# Patient Record
Sex: FEMALE | Race: BLACK OR AFRICAN AMERICAN
[De-identification: names, ages, dates, MRNs, and addresses within clinical notes are randomized per-mention and may not be internally consistent; named-entity substitution may affect disease eponyms.]

---

## 2018-07-15 ENCOUNTER — HOSPITAL ENCOUNTER (EMERGENCY)
Dept: HOSPITAL 47 - EC | Age: 23
Discharge: HOME | End: 2018-07-15
Payer: SELF-PAY

## 2018-07-15 VITALS
TEMPERATURE: 97 F | RESPIRATION RATE: 18 BRPM | SYSTOLIC BLOOD PRESSURE: 121 MMHG | HEART RATE: 81 BPM | DIASTOLIC BLOOD PRESSURE: 67 MMHG

## 2018-07-15 DIAGNOSIS — Z53.8: ICD-10-CM

## 2018-07-15 DIAGNOSIS — F17.200: ICD-10-CM

## 2018-07-15 DIAGNOSIS — L72.9: Primary | ICD-10-CM

## 2018-07-15 LAB
HYALINE CASTS UR QL AUTO: 3 /LPF (ref 0–2)
PH UR: 6 [PH] (ref 5–8)
RBC UR QL: 1 /HPF (ref 0–5)
SP GR UR: 1.02 (ref 1–1.03)
SQUAMOUS UR QL AUTO: 3 /HPF (ref 0–4)
UROBILINOGEN UR QL STRIP: 3 MG/DL (ref ?–2)
WBC #/AREA URNS HPF: 11 /HPF (ref 0–5)

## 2018-07-15 PROCEDURE — 87491 CHLMYD TRACH DNA AMP PROBE: CPT

## 2018-07-15 PROCEDURE — 81025 URINE PREGNANCY TEST: CPT

## 2018-07-15 PROCEDURE — 87070 CULTURE OTHR SPECIMN AEROBIC: CPT

## 2018-07-15 PROCEDURE — 81001 URINALYSIS AUTO W/SCOPE: CPT

## 2018-07-15 PROCEDURE — 87808 TRICHOMONAS ASSAY W/OPTIC: CPT

## 2018-07-15 PROCEDURE — 87205 SMEAR GRAM STAIN: CPT

## 2018-07-15 PROCEDURE — 99283 EMERGENCY DEPT VISIT LOW MDM: CPT

## 2018-07-15 PROCEDURE — 87591 N.GONORRHOEAE DNA AMP PROB: CPT

## 2018-07-15 PROCEDURE — 87086 URINE CULTURE/COLONY COUNT: CPT

## 2018-07-15 NOTE — ED
General Adult HPI





- General


Chief complaint: Skin/Abscess/Foreign Body


Stated complaint: Lump Left Leg


Time Seen by Provider: 07/15/18 13:50


Source: patient, RN notes reviewed


Mode of arrival: ambulatory


Limitations: no limitations





- History of Present Illness


Initial comments: 





Patient 22-year-old female presented to the emergency room today with multiple 

complains.  Patient does admit that she's had a cyst to the back of the left 

leg over the last 6 years.  States that it started as a boil.  States that her 

family helped popliteal area but is stable like this small cyst like structure 

since.  States is not changed.  There is no tenderness.  No redness or other 

complaints.  Patient does admit that she is also worried about STDs.  She 

states that it's been over a year since she's had last unprotected sex.  She 

denies any specific symptoms but states she would like to be checked treated. 

Patient denies any recent fever, chills, shortness of breath, chest pain, back 

pain, abdominal pain, nausea or vomiting, numbness or tingling,  headaches or 

visual changes, or any other complaints.





Review of Systems


ROS Statement: 


Those systems with pertinent positive or pertinent negative responses have been 

documented in the HPI.





ROS Other: All systems not noted in ROS Statement are negative.





Past Medical History


Past Medical History: No Reported History


History of Any Multi-Drug Resistant Organisms: None Reported


Past Surgical History: No Surgical Hx Reported


Past Psychological History: No Psychological Hx Reported


Smoking Status: Current every day smoker


Past Alcohol Use History: None Reported


Past Drug Use History: None Reported





General Exam





- General Exam Comments


Initial Comments: 





General:  The patient is awake and alert, in no distress, and does not appear 

acutely ill. 


Eye:  Pupils are equal, round and reactive to light, extra-ocular movements are 

intact.  No nystagmus.  There is normal conjunctiva bilaterally.  No signs of 

icterus.  


Ears, nose, mouth and throat:  There are moist mucous membranes and no oral 

lesions. 


Neck:  The neck is supple, there is no tenderness or JVD.  


Cardiovascular:  There is a regular rate and rhythm. No murmur, rub or gallop 

is appreciated.


Respiratory:  Lungs are clear to auscultation, respirations are non-labored, 

breath sounds are equal.  No wheezes, stridor, rales, or rhonchi.


Musculoskeletal:  Normal ROM, no tenderness.  Strength 5/5. Sensation intact. 

Pulses equal bilaterally 2+.  


Neurological:  A&O x 3. CN II-XII intact, There are no obvious motor or sensory 

deficits. Coordination appears grossly intact. Speech is normal.


Skin:  Skin is warm and dry.  Cyst like mass to the back of the left thigh 

measuring approximately a centimeter across.  No redness erythema or sign of 

infection.


Psychiatric:  Cooperative, appropriate mood & affect, normal judgment.  


: ER RN to call present for exam.  Normal external exam.  No bleeding and no 

discharge on speculum exam.


Limitations: no limitations





Course





 Vital Signs











  07/15/18





  13:52


 


Temperature 97.0 F L


 


Pulse Rate 81


 


Respiratory 18





Rate 


 


Blood Pressure 121/67


 


O2 Sat by Pulse 98





Oximetry 














Medical Decision Making





- Medical Decision Making





Patient seen in the emergency room for a possible cyst back left thigh was also 

concerned about STDs.  She admits that it's been over a year since she had 

unprotected sex.  Patient initially wanted to be treated.  She states that this 

times no longer wants to be treated and be checked.  Cultures are currently 

pending.





- Lab Data





 Lab Results











  07/15/18 07/15/18 Range/Units





  14:07 14:07 


 


Urine Color   Yellow  


 


Urine Appearance   Clear  (Clear)  


 


Urine pH   6.0  (5.0-8.0)  


 


Ur Specific Gravity   1.023  (1.001-1.035)  


 


Urine Protein   Trace H  (Negative)  


 


Urine Glucose (UA)   Negative  (Negative)  


 


Urine Ketones   Negative  (Negative)  


 


Urine Blood   Negative  (Negative)  


 


Urine Nitrite   Negative  (Negative)  


 


Urine Bilirubin   Negative  (Negative)  


 


Urine Urobilinogen   3.0  (<2.0)  mg/dL


 


Ur Leukocyte Esterase   Trace H  (Negative)  


 


Urine RBC   1  (0-5)  /hpf


 


Urine WBC   11 H  (0-5)  /hpf


 


Urine WBC Clumps   Rare H  (None)  /hpf


 


Ur Squamous Epith Cells   3  (0-4)  /hpf


 


Urine Bacteria   Rare H  (None)  /hpf


 


Hyaline Casts   3 H  (0-2)  /lpf


 


Urine Mucus   Few H  (None)  /hpf


 


Urine HCG, Qual  Not Detected   (Not Detectd)  














Disposition


Clinical Impression: 


 Cyst





Disposition: HOME SELF-CARE


Condition: Good


Additional Instructions: 


Please follow-up with general surgeon for persistent back left thigh.  Please 

return to the concerns.


Is patient prescribed a controlled substance at d/c from ED?: No


Referrals: 


None,Stated [Primary Care Provider] - 1-2 days


Yane Bell DO [Doctor of Osteopathic Medicine] - 1-2 days


Time of Disposition: 14:41

## 2023-09-11 ENCOUNTER — HOSPITAL ENCOUNTER (EMERGENCY)
Dept: HOSPITAL 47 - EC | Age: 28
LOS: 1 days | Discharge: HOME | End: 2023-09-12
Payer: COMMERCIAL

## 2023-09-11 VITALS — TEMPERATURE: 98.2 F

## 2023-09-11 DIAGNOSIS — B37.31: Primary | ICD-10-CM

## 2023-09-11 LAB
PH UR: 6.5 [PH] (ref 5–8)
SP GR UR: 1.02 (ref 1–1.03)
UROBILINOGEN UR QL STRIP: <2 MG/DL (ref ?–2)

## 2023-09-11 PROCEDURE — 99283 EMERGENCY DEPT VISIT LOW MDM: CPT

## 2023-09-11 PROCEDURE — 81025 URINE PREGNANCY TEST: CPT

## 2023-09-11 PROCEDURE — 81003 URINALYSIS AUTO W/O SCOPE: CPT

## 2023-09-11 NOTE — ED
Female Urogenital HPI





- General


Chief complaint: Urogenital


Stated complaint: Yeast Infection


Time Seen by Provider: 09/11/23 22:43


Source: patient, RN notes reviewed


Mode of arrival: ambulatory


Limitations: no limitations





- History of Present Illness


Initial comments: 


This is a 27 year old female who presents to the emergency department for 

concerns of a yeast infection. States that over the last 1-2 days she has been 

experiencing vaginal itching and white discharge. Denies any odors associated 

with this. She had a yeast infection one year ago and states that it feels 

similar. Denies any changes in soaps/detergents or concerns for STD exposure. 

Denies any vaginal bleeding or abdominal pain.





Denies any fevers, chills, sore throat, cough, dyspnea, chest pain, 

palpitations, abdominal pain, nausea, vomiting, diarrhea, back pain, or 

headaches.





MD Complaint: vaginal discharge


Last Menstrual Period: 09/08/23





- Related Data


                                  Previous Rx's











 Medication  Instructions  Recorded


 


Fluconazole [Diflucan] 150 mg PO ONCE 1 Days #1 tab 09/11/23











                                    Allergies











Allergy/AdvReac Type Severity Reaction Status Date / Time


 


No Known Allergies Allergy   Verified 09/11/23 22:09














Review of Systems


ROS Statement: 


Those systems with pertinent positive or pertinent negative responses have been 

documented in the HPI.





ROS Other: All systems not noted in ROS Statement are negative.





Past Medical History


Past Medical History: No Reported History


History of Any Multi-Drug Resistant Organisms: None Reported


Past Surgical History: No Surgical Hx Reported


Past Psychological History: No Psychological Hx Reported


Past Alcohol Use History: None Reported


Past Drug Use History: None Reported





General Exam


Limitations: no limitations


General appearance: alert, in no apparent distress


Head exam: Present: atraumatic, normocephalic, normal inspection


Respiratory exam: Present: normal lung sounds bilaterally.  Absent: respiratory 

distress, wheezes, rales, rhonchi, stridor


Cardiovascular Exam: Present: regular rate, normal rhythm, normal heart sounds. 

 Absent: systolic murmur, diastolic murmur, rubs, gallop, clicks


Neurological exam: Present: alert, oriented X3, CN II-XII intact


Psychiatric exam: Present: normal affect, normal mood


Skin exam: Present: warm, dry, intact, normal color.  Absent: rash





Course


                                   Vital Signs











  09/11/23 09/12/23





  22:04 00:04


 


Temperature 98.2 F 


 


Pulse Rate 68 75


 


Respiratory 22 18





Rate  


 


Blood Pressure 173/84 163/112


 


O2 Sat by Pulse 100 99





Oximetry  














Medical Decision Making





- Medical Decision Making


This is a 27-year-old female who presents to the emergency department for 

vaginal discharge/itching.





Was pt. sent in by a medical professional or institution?


@  -No   


Did you speak to anyone other than the patient for history?  


@  -No


Did you review nursing and triage notes? 


@  -Yes, and I agree, it is accurate with regards to the patient's symptoms.


Were old charts reviewed? 


@  -No


Differential Diagnosis? 


@  -Differential Vaginal Discharge:


UTI, BV, yeast infection, STD, this is not meant to be an all-inclusive list. 


EKG interpreted by me (3pts min.)?


@  -Not obtained


X-rays interpreted by me (1pt min.)?


@  -Not obtained


CT interpreted by me (1pt min.)?


@  -Not obtained


U/S interpreted by me (1pt. min.)?


@  -Not obtained


What testing was considered but not performed? (CT, X-rays, U/S, labs)? Why?


@  -None


What meds were considered but not given? Why?


@  -None


Did you discuss the management of the patient with other professionals?


@  -No


Did you reconcile home meds?


@  -No


Was smoking cessation discussed for >3mins.?


@  -No


Was critical care preformed (if so, how long)?


@  -No


Were there social determinants of health that impacted care today? How? 

(Homelessness, low income, unemployed, alcoholism, drug addiction, 

transportation, low edu. Level, literacy, decrease access to med. care, [facility], 

rehab)?


@  -No


Was there de-escalation of care discussed even if they declined? (Discuss DNR or

 withdrawal of care, Hospice)?


@  -No


What co-morbidities impacted this encounter? (DM, HTN, Smoking, COPD, CAD, 

Cancer, CVA, Hep., AIDS, mental health diagnosis, sleep apnea, morbid obesity)?


@  -None


Was patient admitted / discharged?


@  -Discharged. Urinalysis negative for signs of infection or any other acute 

process.  Given the patient's symptoms and history of the infection, we will 

treat her as such.  She was given a dose of Diflucan in the emergency 

department.  Prescription for an additional tablet of Diflucan provided with 

dosing instructions reviewed.  She is advised to take this in 3 days, on 9/14, 

if symptoms persist. She will otherwise follow up with her PCP.


Undiagnosed new problem with uncertain prognosis?


@  -None


Drug Therapy requiring intensive monitoring for toxicity (Heparin, Nitro, 

Insulin, Cardizem)?


@  -None


Were any procedures done?


@  -None


Diagnosis/symptom?


@  -Yeast infection


Acute, or Chronic, or Acute on Chronic?


@  -Acute


Uncomplicated (without systemic symptoms) or Complicated (systemic symptoms)?


@  -Uncomplicated


Side effects of treatment?


@  -None


Exacerbation, Progression, or Severe Exacerbation]


@  -Not applicable


Poses a threat to life or bodily function?


@  -No





Return precautions reviewed in depth, the patient is instructed to return to the

emergency department with any new, worsening, or concerning symptoms. Patient 

verbalized understanding. 





This case was discussed in detail with the attending ED physician, Dr. Yoder. 

Presentation, findings, and treatment plan discussed in detail as well. 








- Lab Data


                                   Lab Results











  09/11/23 09/11/23 Range/Units





  23:16 23:16 


 


Urine Color  Colorless   


 


Urine Appearance  Clear   (Clear)  


 


Urine pH  6.5   (5.0-8.0)  


 


Ur Specific Gravity  1.016   (1.001-1.035)  


 


Urine Protein  Negative   (Negative)  


 


Urine Glucose (UA)  Negative   (Negative)  


 


Urine Ketones  Negative   (Negative)  


 


Urine Blood  Negative   (Negative)  


 


Urine Nitrite  Negative   (Negative)  


 


Urine Bilirubin  Negative   (Negative)  


 


Urine Urobilinogen  <2.0   (<2.0)  mg/dL


 


Ur Leukocyte Esterase  Negative   (Negative)  


 


Urine HCG, Qual   Not Detected  (Not Detectd)  














Disposition


Clinical Impression: 


 Vulvovaginal candidiasis





Disposition: HOME SELF-CARE


Instructions (If sedation given, give patient instructions):  Yeast Infection 

(ED)


Additional Instructions: 


Return to the emergency department with any new, worsening, or concerning 

symptoms. Take the Diflucan in 3 days, on 9/14 if your symptoms are still 

present. Follow up with your primary care provider in 1-2 days.


Prescriptions: 


Fluconazole [Diflucan] 150 mg PO ONCE 1 Days #1 tab


Is patient prescribed a controlled substance at d/c from ED?: No


Referrals: 


None,Stated [Primary Care Provider] - 1-2 days

## 2023-09-12 VITALS — HEART RATE: 75 BPM | SYSTOLIC BLOOD PRESSURE: 163 MMHG | RESPIRATION RATE: 18 BRPM | DIASTOLIC BLOOD PRESSURE: 112 MMHG
